# Patient Record
Sex: MALE | Race: WHITE | Employment: FULL TIME | URBAN - METROPOLITAN AREA
[De-identification: names, ages, dates, MRNs, and addresses within clinical notes are randomized per-mention and may not be internally consistent; named-entity substitution may affect disease eponyms.]

---

## 2018-02-01 ENCOUNTER — HOSPITAL ENCOUNTER (EMERGENCY)
Age: 43
Discharge: HOME | End: 2018-02-01
Attending: EMERGENCY MEDICINE
Payer: COMMERCIAL

## 2018-02-01 VITALS
HEIGHT: 69 IN | TEMPERATURE: 101.7 F | OXYGEN SATURATION: 96 % | HEART RATE: 116 BPM | RESPIRATION RATE: 18 BRPM | WEIGHT: 210 LBS | BODY MASS INDEX: 31.1 KG/M2 | SYSTOLIC BLOOD PRESSURE: 161 MMHG | DIASTOLIC BLOOD PRESSURE: 90 MMHG

## 2018-02-01 DIAGNOSIS — J02.9 VIRAL PHARYNGITIS: ICD-10-CM

## 2018-02-01 DIAGNOSIS — J11.1 FLU: Primary | ICD-10-CM

## 2018-02-01 LAB
RAPID INFLUENZA  B AGN: POSITIVE
RAPID INFLUENZA A AGN: NEGATIVE
S PYO AG THROAT QL: NEGATIVE

## 2018-02-01 PROCEDURE — 87081 CULTURE SCREEN ONLY: CPT

## 2018-02-01 PROCEDURE — 99283 EMERGENCY DEPT VISIT LOW MDM: CPT

## 2018-02-01 PROCEDURE — 6370000000 HC RX 637 (ALT 250 FOR IP): Performed by: EMERGENCY MEDICINE

## 2018-02-01 PROCEDURE — 87880 STREP A ASSAY W/OPTIC: CPT

## 2018-02-01 PROCEDURE — 86403 PARTICLE AGGLUT ANTBDY SCRN: CPT

## 2018-02-01 RX ORDER — OSELTAMIVIR PHOSPHATE 75 MG/1
75 CAPSULE ORAL 2 TIMES DAILY
Qty: 10 CAPSULE | Refills: 0 | Status: SHIPPED | OUTPATIENT
Start: 2018-02-01 | End: 2018-02-06

## 2018-02-01 RX ORDER — IBUPROFEN 800 MG/1
800 TABLET ORAL EVERY 8 HOURS PRN
Qty: 30 TABLET | Refills: 0 | Status: SHIPPED | OUTPATIENT
Start: 2018-02-01

## 2018-02-01 RX ORDER — ACETAMINOPHEN 500 MG
1000 TABLET ORAL ONCE
Status: COMPLETED | OUTPATIENT
Start: 2018-02-01 | End: 2018-02-01

## 2018-02-01 RX ORDER — ESCITALOPRAM OXALATE 10 MG/1
10 TABLET ORAL DAILY
Status: ON HOLD | COMMUNITY
End: 2021-05-05

## 2018-02-01 RX ORDER — FENOFIBRATE 145 MG/1
145 TABLET, COATED ORAL DAILY
COMMUNITY

## 2018-02-01 RX ADMIN — ACETAMINOPHEN 1000 MG: 500 TABLET ORAL at 01:46

## 2018-02-01 ASSESSMENT — ENCOUNTER SYMPTOMS
SHORTNESS OF BREATH: 0
VOMITING: 0
SINUS PAIN: 1
FACIAL SWELLING: 0
CONSTIPATION: 0
ABDOMINAL PAIN: 0
BLOOD IN STOOL: 0
CHOKING: 0
RHINORRHEA: 1
EYE REDNESS: 0
VOICE CHANGE: 0
BACK PAIN: 0
WHEEZING: 0
SINUS PRESSURE: 0
CHEST TIGHTNESS: 0
EYE PAIN: 0
COUGH: 0
EYE DISCHARGE: 0
STRIDOR: 0
TROUBLE SWALLOWING: 0
SORE THROAT: 0
DIARRHEA: 0

## 2018-02-01 ASSESSMENT — PAIN DESCRIPTION - LOCATION: LOCATION: THROAT

## 2018-02-01 ASSESSMENT — PAIN SCALES - GENERAL: PAINLEVEL_OUTOF10: 8

## 2018-02-01 ASSESSMENT — PAIN DESCRIPTION - DESCRIPTORS: DESCRIPTORS: ACHING

## 2018-02-03 LAB — S PYO THROAT QL CULT: NORMAL

## 2021-05-05 ENCOUNTER — HOSPITAL ENCOUNTER (OUTPATIENT)
Age: 46
Setting detail: OUTPATIENT SURGERY
Discharge: HOME | End: 2021-05-05
Attending: ORTHOPAEDIC SURGERY | Admitting: ORTHOPAEDIC SURGERY
Payer: COMMERCIAL

## 2021-05-05 ENCOUNTER — APPOINTMENT (OUTPATIENT)
Dept: GENERAL RADIOLOGY | Age: 46
End: 2021-05-05
Attending: ORTHOPAEDIC SURGERY
Payer: COMMERCIAL

## 2021-05-05 ENCOUNTER — ANESTHESIA (OUTPATIENT)
Dept: OPERATING ROOM | Age: 46
End: 2021-05-05
Payer: COMMERCIAL

## 2021-05-05 ENCOUNTER — ANESTHESIA EVENT (OUTPATIENT)
Dept: OPERATING ROOM | Age: 46
End: 2021-05-05
Payer: COMMERCIAL

## 2021-05-05 VITALS — DIASTOLIC BLOOD PRESSURE: 58 MMHG | TEMPERATURE: 94.6 F | OXYGEN SATURATION: 98 % | SYSTOLIC BLOOD PRESSURE: 106 MMHG

## 2021-05-05 VITALS
TEMPERATURE: 97 F | HEART RATE: 58 BPM | SYSTOLIC BLOOD PRESSURE: 91 MMHG | DIASTOLIC BLOOD PRESSURE: 54 MMHG | OXYGEN SATURATION: 98 % | RESPIRATION RATE: 16 BRPM

## 2021-05-05 LAB — SARS-COV-2, NAAT: NOT DETECTED

## 2021-05-05 PROCEDURE — 87635 SARS-COV-2 COVID-19 AMP PRB: CPT

## 2021-05-05 PROCEDURE — 2500000003 HC RX 250 WO HCPCS: Performed by: NURSE ANESTHETIST, CERTIFIED REGISTERED

## 2021-05-05 PROCEDURE — 7100000011 HC PHASE II RECOVERY - ADDTL 15 MIN: Performed by: ORTHOPAEDIC SURGERY

## 2021-05-05 PROCEDURE — 7100000001 HC PACU RECOVERY - ADDTL 15 MIN: Performed by: ORTHOPAEDIC SURGERY

## 2021-05-05 PROCEDURE — 3600000002 HC SURGERY LEVEL 2 BASE: Performed by: ORTHOPAEDIC SURGERY

## 2021-05-05 PROCEDURE — 7100000010 HC PHASE II RECOVERY - FIRST 15 MIN: Performed by: ORTHOPAEDIC SURGERY

## 2021-05-05 PROCEDURE — 2580000003 HC RX 258: Performed by: ORTHOPAEDIC SURGERY

## 2021-05-05 PROCEDURE — 6360000002 HC RX W HCPCS: Performed by: ORTHOPAEDIC SURGERY

## 2021-05-05 PROCEDURE — 3700000000 HC ANESTHESIA ATTENDED CARE: Performed by: ORTHOPAEDIC SURGERY

## 2021-05-05 PROCEDURE — 2709999900 HC NON-CHARGEABLE SUPPLY: Performed by: ORTHOPAEDIC SURGERY

## 2021-05-05 PROCEDURE — 6360000002 HC RX W HCPCS: Performed by: NURSE ANESTHETIST, CERTIFIED REGISTERED

## 2021-05-05 PROCEDURE — 3600000012 HC SURGERY LEVEL 2 ADDTL 15MIN: Performed by: ORTHOPAEDIC SURGERY

## 2021-05-05 PROCEDURE — 2500000003 HC RX 250 WO HCPCS: Performed by: ORTHOPAEDIC SURGERY

## 2021-05-05 PROCEDURE — 3700000001 HC ADD 15 MINUTES (ANESTHESIA): Performed by: ORTHOPAEDIC SURGERY

## 2021-05-05 PROCEDURE — 7100000000 HC PACU RECOVERY - FIRST 15 MIN: Performed by: ORTHOPAEDIC SURGERY

## 2021-05-05 RX ORDER — DEXAMETHASONE SODIUM PHOSPHATE 4 MG/ML
INJECTION, SOLUTION INTRA-ARTICULAR; INTRALESIONAL; INTRAMUSCULAR; INTRAVENOUS; SOFT TISSUE PRN
Status: DISCONTINUED | OUTPATIENT
Start: 2021-05-05 | End: 2021-05-05 | Stop reason: SDUPTHER

## 2021-05-05 RX ORDER — FENTANYL CITRATE 50 UG/ML
50 INJECTION, SOLUTION INTRAMUSCULAR; INTRAVENOUS EVERY 10 MIN PRN
Status: DISCONTINUED | OUTPATIENT
Start: 2021-05-05 | End: 2021-05-05 | Stop reason: HOSPADM

## 2021-05-05 RX ORDER — MAGNESIUM HYDROXIDE 1200 MG/15ML
LIQUID ORAL CONTINUOUS PRN
Status: COMPLETED | OUTPATIENT
Start: 2021-05-05 | End: 2021-05-05

## 2021-05-05 RX ORDER — SODIUM CHLORIDE, SODIUM LACTATE, POTASSIUM CHLORIDE, CALCIUM CHLORIDE 600; 310; 30; 20 MG/100ML; MG/100ML; MG/100ML; MG/100ML
INJECTION, SOLUTION INTRAVENOUS CONTINUOUS
Status: DISCONTINUED | OUTPATIENT
Start: 2021-05-05 | End: 2021-05-05 | Stop reason: HOSPADM

## 2021-05-05 RX ORDER — ONDANSETRON 2 MG/ML
4 INJECTION INTRAMUSCULAR; INTRAVENOUS
Status: DISCONTINUED | OUTPATIENT
Start: 2021-05-05 | End: 2021-05-05 | Stop reason: HOSPADM

## 2021-05-05 RX ORDER — HYDROCODONE BITARTRATE AND ACETAMINOPHEN 5; 325 MG/1; MG/1
1 TABLET ORAL PRN
Status: DISCONTINUED | OUTPATIENT
Start: 2021-05-05 | End: 2021-05-05 | Stop reason: HOSPADM

## 2021-05-05 RX ORDER — FENTANYL CITRATE 50 UG/ML
INJECTION, SOLUTION INTRAMUSCULAR; INTRAVENOUS PRN
Status: DISCONTINUED | OUTPATIENT
Start: 2021-05-05 | End: 2021-05-05 | Stop reason: SDUPTHER

## 2021-05-05 RX ORDER — SODIUM CHLORIDE 0.9 % (FLUSH) 0.9 %
10 SYRINGE (ML) INJECTION PRN
Status: DISCONTINUED | OUTPATIENT
Start: 2021-05-05 | End: 2021-05-05 | Stop reason: HOSPADM

## 2021-05-05 RX ORDER — SODIUM CHLORIDE 9 MG/ML
25 INJECTION, SOLUTION INTRAVENOUS PRN
Status: DISCONTINUED | OUTPATIENT
Start: 2021-05-05 | End: 2021-05-05 | Stop reason: HOSPADM

## 2021-05-05 RX ORDER — DIPHENHYDRAMINE HYDROCHLORIDE 50 MG/ML
12.5 INJECTION INTRAMUSCULAR; INTRAVENOUS
Status: DISCONTINUED | OUTPATIENT
Start: 2021-05-05 | End: 2021-05-05 | Stop reason: HOSPADM

## 2021-05-05 RX ORDER — LIDOCAINE HYDROCHLORIDE 20 MG/ML
INJECTION, SOLUTION INTRAVENOUS PRN
Status: DISCONTINUED | OUTPATIENT
Start: 2021-05-05 | End: 2021-05-05 | Stop reason: SDUPTHER

## 2021-05-05 RX ORDER — ONDANSETRON 2 MG/ML
INJECTION INTRAMUSCULAR; INTRAVENOUS PRN
Status: DISCONTINUED | OUTPATIENT
Start: 2021-05-05 | End: 2021-05-05 | Stop reason: SDUPTHER

## 2021-05-05 RX ORDER — MIDAZOLAM HYDROCHLORIDE 2 MG/2ML
INJECTION, SOLUTION INTRAMUSCULAR; INTRAVENOUS PRN
Status: DISCONTINUED | OUTPATIENT
Start: 2021-05-05 | End: 2021-05-05 | Stop reason: SDUPTHER

## 2021-05-05 RX ORDER — CEFAZOLIN SODIUM 2 G/50ML
2000 SOLUTION INTRAVENOUS
Status: COMPLETED | OUTPATIENT
Start: 2021-05-05 | End: 2021-05-05

## 2021-05-05 RX ORDER — PROPOFOL 10 MG/ML
INJECTION, EMULSION INTRAVENOUS PRN
Status: DISCONTINUED | OUTPATIENT
Start: 2021-05-05 | End: 2021-05-05 | Stop reason: SDUPTHER

## 2021-05-05 RX ORDER — HYDROCODONE BITARTRATE AND ACETAMINOPHEN 5; 325 MG/1; MG/1
2 TABLET ORAL PRN
Status: DISCONTINUED | OUTPATIENT
Start: 2021-05-05 | End: 2021-05-05 | Stop reason: HOSPADM

## 2021-05-05 RX ORDER — LIDOCAINE HYDROCHLORIDE 10 MG/ML
1 INJECTION, SOLUTION EPIDURAL; INFILTRATION; INTRACAUDAL; PERINEURAL
Status: DISCONTINUED | OUTPATIENT
Start: 2021-05-05 | End: 2021-05-05 | Stop reason: HOSPADM

## 2021-05-05 RX ORDER — SODIUM CHLORIDE 0.9 % (FLUSH) 0.9 %
10 SYRINGE (ML) INJECTION EVERY 12 HOURS SCHEDULED
Status: DISCONTINUED | OUTPATIENT
Start: 2021-05-05 | End: 2021-05-05 | Stop reason: HOSPADM

## 2021-05-05 RX ORDER — MEPERIDINE HYDROCHLORIDE 25 MG/ML
12.5 INJECTION INTRAMUSCULAR; INTRAVENOUS; SUBCUTANEOUS EVERY 5 MIN PRN
Status: DISCONTINUED | OUTPATIENT
Start: 2021-05-05 | End: 2021-05-05 | Stop reason: HOSPADM

## 2021-05-05 RX ORDER — BUPIVACAINE HYDROCHLORIDE 5 MG/ML
INJECTION, SOLUTION EPIDURAL; INTRACAUDAL PRN
Status: DISCONTINUED | OUTPATIENT
Start: 2021-05-05 | End: 2021-05-05 | Stop reason: ALTCHOICE

## 2021-05-05 RX ORDER — METOCLOPRAMIDE HYDROCHLORIDE 5 MG/ML
10 INJECTION INTRAMUSCULAR; INTRAVENOUS
Status: DISCONTINUED | OUTPATIENT
Start: 2021-05-05 | End: 2021-05-05 | Stop reason: HOSPADM

## 2021-05-05 RX ORDER — ROCURONIUM BROMIDE 10 MG/ML
INJECTION, SOLUTION INTRAVENOUS PRN
Status: DISCONTINUED | OUTPATIENT
Start: 2021-05-05 | End: 2021-05-05 | Stop reason: SDUPTHER

## 2021-05-05 RX ADMIN — FENTANYL CITRATE 25 MCG: 50 INJECTION, SOLUTION INTRAMUSCULAR; INTRAVENOUS at 09:31

## 2021-05-05 RX ADMIN — CEFAZOLIN SODIUM 2000 MG: 2 SOLUTION INTRAVENOUS at 09:24

## 2021-05-05 RX ADMIN — LIDOCAINE HYDROCHLORIDE 50 MG: 20 INJECTION, SOLUTION INTRAVENOUS at 09:29

## 2021-05-05 RX ADMIN — ONDANSETRON 4 MG: 2 INJECTION INTRAMUSCULAR; INTRAVENOUS at 10:05

## 2021-05-05 RX ADMIN — ROCURONIUM BROMIDE 10 MG: 10 INJECTION INTRAVENOUS at 10:17

## 2021-05-05 RX ADMIN — FENTANYL CITRATE 25 MCG: 50 INJECTION, SOLUTION INTRAMUSCULAR; INTRAVENOUS at 09:30

## 2021-05-05 RX ADMIN — DEXAMETHASONE SODIUM PHOSPHATE 4 MG: 4 INJECTION, SOLUTION INTRAMUSCULAR; INTRAVENOUS at 09:37

## 2021-05-05 RX ADMIN — FENTANYL CITRATE 50 MCG: 50 INJECTION, SOLUTION INTRAMUSCULAR; INTRAVENOUS at 10:58

## 2021-05-05 RX ADMIN — FENTANYL CITRATE 25 MCG: 50 INJECTION, SOLUTION INTRAMUSCULAR; INTRAVENOUS at 09:32

## 2021-05-05 RX ADMIN — ROCURONIUM BROMIDE 20 MG: 10 INJECTION INTRAVENOUS at 10:22

## 2021-05-05 RX ADMIN — SODIUM CHLORIDE, POTASSIUM CHLORIDE, SODIUM LACTATE AND CALCIUM CHLORIDE: 600; 310; 30; 20 INJECTION, SOLUTION INTRAVENOUS at 08:31

## 2021-05-05 RX ADMIN — FENTANYL CITRATE 25 MCG: 50 INJECTION, SOLUTION INTRAMUSCULAR; INTRAVENOUS at 10:00

## 2021-05-05 RX ADMIN — FENTANYL CITRATE 25 MCG: 50 INJECTION, SOLUTION INTRAMUSCULAR; INTRAVENOUS at 09:29

## 2021-05-05 RX ADMIN — SODIUM CHLORIDE, POTASSIUM CHLORIDE, SODIUM LACTATE AND CALCIUM CHLORIDE: 600; 310; 30; 20 INJECTION, SOLUTION INTRAVENOUS at 10:45

## 2021-05-05 RX ADMIN — PROPOFOL 200 MG: 10 INJECTION, EMULSION INTRAVENOUS at 09:29

## 2021-05-05 RX ADMIN — FENTANYL CITRATE 25 MCG: 50 INJECTION, SOLUTION INTRAMUSCULAR; INTRAVENOUS at 09:58

## 2021-05-05 RX ADMIN — MIDAZOLAM HYDROCHLORIDE 2 MG: 1 INJECTION, SOLUTION INTRAMUSCULAR; INTRAVENOUS at 09:22

## 2021-05-05 RX ADMIN — SUGAMMADEX 200 MG: 100 INJECTION, SOLUTION INTRAVENOUS at 10:54

## 2021-05-05 ASSESSMENT — PULMONARY FUNCTION TESTS
PIF_VALUE: 10
PIF_VALUE: 10
PIF_VALUE: 11
PIF_VALUE: 2
PIF_VALUE: 11
PIF_VALUE: 3
PIF_VALUE: 10
PIF_VALUE: 14
PIF_VALUE: 10
PIF_VALUE: 14
PIF_VALUE: 11
PIF_VALUE: 10
PIF_VALUE: 13
PIF_VALUE: 14
PIF_VALUE: 14
PIF_VALUE: 10
PIF_VALUE: 1
PIF_VALUE: 14
PIF_VALUE: 10
PIF_VALUE: 14
PIF_VALUE: 13
PIF_VALUE: 11
PIF_VALUE: 10
PIF_VALUE: 10
PIF_VALUE: 14
PIF_VALUE: 3
PIF_VALUE: 14
PIF_VALUE: 11
PIF_VALUE: 14
PIF_VALUE: 11
PIF_VALUE: 0
PIF_VALUE: 4
PIF_VALUE: 10
PIF_VALUE: 14
PIF_VALUE: 10
PIF_VALUE: 11
PIF_VALUE: 14
PIF_VALUE: 10
PIF_VALUE: 14
PIF_VALUE: 10
PIF_VALUE: 14
PIF_VALUE: 11
PIF_VALUE: 10
PIF_VALUE: 10
PIF_VALUE: 0
PIF_VALUE: 10
PIF_VALUE: 9
PIF_VALUE: 14
PIF_VALUE: 10
PIF_VALUE: 10
PIF_VALUE: 11
PIF_VALUE: 4
PIF_VALUE: 11
PIF_VALUE: 10
PIF_VALUE: 10
PIF_VALUE: 11

## 2021-05-05 NOTE — ANESTHESIA POSTPROCEDURE EVALUATION
Department of Anesthesiology  Postprocedure Note    Patient: Kaylynn Ramirez  MRN: [id number]  YOB: 1975  Date of evaluation: 5/5/2021  Time:  11:13 AM     Procedure Summary     Date: 05/05/21 Room / Location: [address]    Anesthesia Start: 2298 Anesthesia Stop:     Procedure: RIGHT SMALL FINGER FLEXOR TENDON REPAIR (Right Fingers) Diagnosis: (RIGHT SMALL FINGER FLEXOR TENDON  LACERATION)    Surgeons: Nestor Esteves DO Responsible Provider: Dorothey Lefort, DO    Anesthesia Type: general ASA Status: 1          Anesthesia Type: No value filed. Hernan Phase I:      Hernan Phase II:      Last vitals: Reviewed and per EMR flowsheets.        Anesthesia Post Evaluation    Patient location during evaluation: PACU  Patient participation: waiting for patient participation  Level of consciousness: responsive to physical stimuli  Pain score: 0  Airway patency: patent  Nausea & Vomiting: no vomiting and no nausea  Complications: no  Cardiovascular status: hemodynamically stable  Respiratory status: acceptable, face mask and oral airway  Hydration status: stable

## 2021-05-05 NOTE — ANESTHESIA PRE PROCEDURE
last solid consumption: 1900                        Date of last liquid consumption: 05/04/21                        Date of last solid food consumption: 05/04/21    BMI:   Wt Readings from Last 3 Encounters:   02/01/18 210 lb (95.3 kg)     There is no height or weight on file to calculate BMI.    CBC: No results found for: WBC, RBC, HGB, HCT, MCV, RDW, PLT    CMP: No results found for: NA, K, CL, CO2, BUN, CREATININE, GFRAA, AGRATIO, LABGLOM, GLUCOSE, PROT, CALCIUM, BILITOT, ALKPHOS, AST, ALT    POC Tests: No results for input(s): POCGLU, POCNA, POCK, POCCL, POCBUN, POCHEMO, POCHCT in the last 72 hours. Coags: No results found for: PROTIME, INR, APTT    HCG (If Applicable): No results found for: PREGTESTUR, PREGSERUM, HCG, HCGQUANT     ABGs: No results found for: PHART, PO2ART, SPQ5QSQ, LYW6DUA, BEART, [id number]     Type & Screen (If Applicable):  No results found for: LABABO, LABRH    Drug/Infectious Status (If Applicable):  No results found for: HIV, HEPCAB    COVID-19 Screening (If Applicable):   Lab Results   Component Value Date    COVID19 Not Detected 05/05/2021           Anesthesia Evaluation  Patient summary reviewed and Nursing notes reviewed no history of anesthetic complications:   Airway: Mallampati: II  TM distance: >3 FB   Neck ROM: full  Mouth opening: > = 3 FB Dental: normal exam         Pulmonary:Negative Pulmonary ROS and normal exam  breath sounds clear to auscultation                             Cardiovascular:  Exercise tolerance: good (>4 METS),   (+) hyperlipidemia        Rhythm: regular  Rate: normal           Beta Blocker:  Not on Beta Blocker         Neuro/Psych:   (+) psychiatric history:            GI/Hepatic/Renal: Neg GI/Hepatic/Renal ROS            Endo/Other: Negative Endo/Other ROS             Pt had PAT visit. Abdominal:           Vascular: negative vascular ROS. Anesthesia Plan      general     ASA 1     (LMA)  Induction: intravenous. MIPS: Postoperative opioids intended and Prophylactic antiemetics administered. Anesthetic plan and risks discussed with patient. Plan discussed with CRNA.     Attending anesthesiologist reviewed and agrees with DO Maggie   5/5/2021

## 2021-05-05 NOTE — OP NOTE
Operative Note      Patient: Cholo Gabriel  YOB: 1975  MRN: [id number]    Date of Procedure: 5/5/2021    Preoperative diagnosis: Right small finger laceration within flexor zone 2 with flexor tendon disruption    Postoperative diagnosis: Right small finger laceration within flexor zone 2 with full-thickness laceration of flexor digitorum profundus     Procedure planned: Open exploration to right small finger with excisional debridement and repair of flexor tendon or flexor tendons    Procedure performed: Right small finger excisional debridement removing nonviable skin subcutaneous tissue tendon and pulley. Repair of flexor digitorum profundus laceration within flexor zone 2    Surgeon: Pawel Donovan D.O. Assistant: AKILA Armstrong  The physician assistant was present through the entire case. Given the nature of the disease process and the procedure to be performed a skilled surgical assistant was necessary during the case. The assistant was necessary in order to hold retractors and directly assist in the operation. A certified scrub tech was at the back table managing instruments and supplies for the surgical case. Anesthesia: General    Estimated blood loss: Less than 10 cc    Drains: None    Tourniquet: Approximately 45 minutes at 250 mmHg to the well-padded right upper arm    Specimens: None    Implants: None    Indications: The patient presented to the office yesterday afternoon status post laceration of the left small finger. Laceration was approximately 1 mm distal to the PIP flexion crease. Based on physical examination was felt that both flexor digitorum profundus and flexor digitorum superficialis were lacerated. Treatment options were discussed. The patient elected to proceed forth with recommended surgery for open exploration with repair of flexor tendons as needed. Informed consent was signed and placed in the chart.   We took time to explain the need for meticulous compliance and rigorous formal therapy sessions if he wanted to have a good outcome with this complex procedure. Complications: None noted at the time of surgery     Description of operation: The patient was taken to the operative suite and placed in the supine position on the operating table. A timeout was performed and the right small finger was confirmed to be the operative site. He was carefully positioned in such a fashion as to pad all bony prominences and peripheral nerves. He was administered appropriate IV antibiotics and general anesthesia. He was prepped and draped in the normal sterile fashion. The tourniquet was elevated. The hand was positioned on the table with the help of a lead hand positioner. A planned Bev Dais incision was marked out over the volar aspect of the right small finger incorporating the zone of traumatic laceration. The 15 blade was used to incise skin extending from the MCP flexion crease up to the pulp. Dissection was carried through the subcutaneous plane with the tenotomy scissors exposing the flexor retinacular sheath. There was obvious injury to the flexor sheath within flexor zone 2. The profundus stump distally was noted to be approximately 1.5 cm and found deep to the A5 pulley. The proximal stump was noted to be atypically retracted and found at approximately the level between the proximal and distal palmar flexion crease. Care was taken to preserve the A2 and A4 pulleys as we passed the profundus stump distally through the pulley system. Flexor digitorum superficialis was intact. We had just enough distal stump to repair the flexor digitorum profundus primarily. 4-0 fiber loop suture through a 6 core stitch technique as described by Dr. Marlo Costa was used to repair the flexor digitorum profundus. This was reinforced with 6.0 nylon epitendinous stitch. Irrigation was performed. There was no gapping of the repair with passive extension of the digit.   The tourniquet was relieved and hemostasis achieved. The digit was noted to be viable. Neurovascular bundles were not noted to be injured with the traumatic injury. Hemostasis was achieved. Interrupted nylon stitches were used to close the skin. A bulky soft dressing was placed along with dorsal blocking splint with the hand in the intrinsic plus position. The patient was allowed to arise from anesthesia and taken to recovery in stable condition. Overall he tolerated the procedure well.      Disposition: Stable to PACU        Electronically signed by Micheal Mora DO on 5/5/2021 at 3:54 PM

## 2021-05-05 NOTE — PROGRESS NOTES
Patient received from PACU, no complaints of pain. Snack and beverage given, tolerating well. Discharge instructions given, patient verbalized understanding.

## 2021-05-05 NOTE — PROGRESS NOTES
Patient ID:  Troy Menchaca  [id number]  54 y.o.  1975  BOVIE PAD SITE CLEAR AND INTACT PRE AND POST OP. TAKEN TO PACU,   ATTACHED TO MONITOR AND REPORT GIVEN TO RN.   VSS DRSG DRY AND INTACT, SLING ON        Electronically signed by Marcelle Hallman RN on 5/5/2021

## 2021-10-15 ENCOUNTER — HOSPITAL ENCOUNTER (OUTPATIENT)
Dept: GENERAL RADIOLOGY | Age: 46
Discharge: HOME | End: 2021-10-15

## 2021-10-15 DIAGNOSIS — Z02.1 PHYSICAL EXAM, PRE-EMPLOYMENT: ICD-10-CM

## 2022-03-03 ENCOUNTER — OFFICE VISIT (OUTPATIENT)
Dept: INTERNAL MEDICINE | Age: 47
End: 2022-03-03
Payer: COMMERCIAL

## 2022-03-03 VITALS
SYSTOLIC BLOOD PRESSURE: 120 MMHG | WEIGHT: 220 LBS | OXYGEN SATURATION: 98 % | DIASTOLIC BLOOD PRESSURE: 84 MMHG | HEART RATE: 83 BPM | HEIGHT: 69 IN | BODY MASS INDEX: 32.58 KG/M2

## 2022-03-03 DIAGNOSIS — J06.9 ACUTE URI: Primary | ICD-10-CM

## 2022-03-03 DIAGNOSIS — R68.89 FLU-LIKE SYMPTOMS: ICD-10-CM

## 2022-03-03 LAB
INFLUENZA A ANTIBODY: NORMAL
INFLUENZA B ANTIBODY: NORMAL

## 2022-03-03 PROCEDURE — G8482 FLU IMMUNIZE ORDER/ADMIN: HCPCS | Performed by: NURSE PRACTITIONER

## 2022-03-03 PROCEDURE — 1036F TOBACCO NON-USER: CPT | Performed by: NURSE PRACTITIONER

## 2022-03-03 PROCEDURE — G8417 CALC BMI ABV UP PARAM F/U: HCPCS | Performed by: NURSE PRACTITIONER

## 2022-03-03 PROCEDURE — 99202 OFFICE O/P NEW SF 15 MIN: CPT | Performed by: NURSE PRACTITIONER

## 2022-03-03 PROCEDURE — G8427 DOCREV CUR MEDS BY ELIG CLIN: HCPCS | Performed by: NURSE PRACTITIONER

## 2022-03-03 PROCEDURE — 87804 INFLUENZA ASSAY W/OPTIC: CPT | Performed by: NURSE PRACTITIONER

## 2022-03-03 SDOH — ECONOMIC STABILITY: FOOD INSECURITY: WITHIN THE PAST 12 MONTHS, YOU WORRIED THAT YOUR FOOD WOULD RUN OUT BEFORE YOU GOT MONEY TO BUY MORE.: NEVER TRUE

## 2022-03-03 SDOH — ECONOMIC STABILITY: FOOD INSECURITY: WITHIN THE PAST 12 MONTHS, THE FOOD YOU BOUGHT JUST DIDN'T LAST AND YOU DIDN'T HAVE MONEY TO GET MORE.: NEVER TRUE

## 2022-03-03 ASSESSMENT — ENCOUNTER SYMPTOMS
ABDOMINAL PAIN: 0
COUGH: 1
VOMITING: 0
RHINORRHEA: 0
SHORTNESS OF BREATH: 0
SINUS PRESSURE: 1
NAUSEA: 0
DIARRHEA: 0
SINUS PAIN: 0
SORE THROAT: 1
WHEEZING: 0

## 2022-03-03 ASSESSMENT — PATIENT HEALTH QUESTIONNAIRE - PHQ9
7. TROUBLE CONCENTRATING ON THINGS, SUCH AS READING THE NEWSPAPER OR WATCHING TELEVISION: 0
SUM OF ALL RESPONSES TO PHQ QUESTIONS 1-9: 3
SUM OF ALL RESPONSES TO PHQ QUESTIONS 1-9: 3
4. FEELING TIRED OR HAVING LITTLE ENERGY: 1
1. LITTLE INTEREST OR PLEASURE IN DOING THINGS: 0
SUM OF ALL RESPONSES TO PHQ9 QUESTIONS 1 & 2: 0
2. FEELING DOWN, DEPRESSED OR HOPELESS: 0
SUM OF ALL RESPONSES TO PHQ QUESTIONS 1-9: 3
9. THOUGHTS THAT YOU WOULD BE BETTER OFF DEAD, OR OF HURTING YOURSELF: 0
8. MOVING OR SPEAKING SO SLOWLY THAT OTHER PEOPLE COULD HAVE NOTICED. OR THE OPPOSITE, BEING SO FIGETY OR RESTLESS THAT YOU HAVE BEEN MOVING AROUND A LOT MORE THAN USUAL: 0
5. POOR APPETITE OR OVEREATING: 0
SUM OF ALL RESPONSES TO PHQ QUESTIONS 1-9: 3
10. IF YOU CHECKED OFF ANY PROBLEMS, HOW DIFFICULT HAVE THESE PROBLEMS MADE IT FOR YOU TO DO YOUR WORK, TAKE CARE OF THINGS AT HOME, OR GET ALONG WITH OTHER PEOPLE: 0
6. FEELING BAD ABOUT YOURSELF - OR THAT YOU ARE A FAILURE OR HAVE LET YOURSELF OR YOUR FAMILY DOWN: 0
3. TROUBLE FALLING OR STAYING ASLEEP: 2

## 2022-03-03 ASSESSMENT — SOCIAL DETERMINANTS OF HEALTH (SDOH): HOW HARD IS IT FOR YOU TO PAY FOR THE VERY BASICS LIKE FOOD, HOUSING, MEDICAL CARE, AND HEATING?: NOT HARD AT ALL

## 2022-03-03 NOTE — PROGRESS NOTES
Subjective:      Patient ID: Twyla Mane is a 51 y.o. male who presents today for:  Chief Complaint   Patient presents with    URI     body aches, fatigue, x 3 days, home covid test was negative this morning       URI   This is a new problem. Episode onset: 3 days ago. The problem has been gradually worsening. There has been no fever. Associated symptoms include congestion, coughing, headaches and a sore throat. Pertinent negatives include no abdominal pain, chest pain, diarrhea, ear pain, nausea, rash, rhinorrhea, sinus pain, vomiting or wheezing. Associated symptoms comments: Denies loss of sense of taste or smell, no known exposure to covid, negative test at home. He has tried nothing for the symptoms. History reviewed. No pertinent past medical history. Past Surgical History:   Procedure Laterality Date    CORNEAL TRANSPLANT Left     HAND TENDON SURGERY Right 5/5/2021    RIGHT SMALL FINGER FLEXOR TENDON REPAIR performed by Norbert Apodaca DO at [facility]     History reviewed. No pertinent family history. No Known Allergies      Review of Systems   Constitutional: Positive for fatigue. Negative for chills and fever. HENT: Positive for congestion, postnasal drip, sinus pressure and sore throat. Negative for ear pain, rhinorrhea and sinus pain. Respiratory: Positive for cough. Negative for shortness of breath and wheezing. Cardiovascular: Negative for chest pain. Gastrointestinal: Negative for abdominal pain, diarrhea, nausea and vomiting. Musculoskeletal: Negative for arthralgias and myalgias. Skin: Negative for rash. Neurological: Positive for headaches. Objective:   /84 (Site: Left Upper Arm, Position: Sitting, Cuff Size: Large Adult)   Pulse 83   Ht 5' 9\" (1.753 m)   Wt 220 lb (99.8 kg)   SpO2 98%   BMI 32.49 kg/m²     Physical Exam  Vitals reviewed. Constitutional:       General: He is not in acute distress. Appearance: He is well-developed.  He is not ill-appearing. HENT:      Head: Normocephalic. Right Ear: Tympanic membrane, ear canal and external ear normal.      Left Ear: Tympanic membrane, ear canal and external ear normal.      Nose: Nose normal. No mucosal edema, congestion or rhinorrhea. Right Sinus: No maxillary sinus tenderness or frontal sinus tenderness. Left Sinus: No maxillary sinus tenderness or frontal sinus tenderness. Mouth/Throat:      Lips: Pink. Mouth: Mucous membranes are moist.      Pharynx: Oropharynx is clear. No oropharyngeal exudate or posterior oropharyngeal erythema. Comments: PND  Cardiovascular:      Rate and Rhythm: Normal rate and regular rhythm. Heart sounds: Normal heart sounds. Pulmonary:      Effort: Pulmonary effort is normal. No respiratory distress. Breath sounds: Normal breath sounds. No decreased breath sounds or wheezing. Musculoskeletal:         General: Normal range of motion. Lymphadenopathy:      Cervical: No cervical adenopathy. Skin:     General: Skin is warm and dry. Neurological:      Mental Status: He is alert and oriented to person, place, and time. Assessment:       Diagnosis Orders   1. Acute URI     2. Flu-like symptoms  POCT Influenza A/B         Plan:      Orders Placed This Encounter   Procedures    POCT Influenza A/B     Flu negative. Reviewed usual course of a viral illness, preventing the spread to others, and supportive measures for symptom management. OTC symptom control. Reviewed symptoms requiring ER. Patient verbalizes understanding. I have reviewed and updated the electronic medical record. Return if symptoms worsen or fail to improve, for follow up with PCP.     XENA York - NP

## 2022-03-03 NOTE — PATIENT INSTRUCTIONS

## 2023-10-09 DIAGNOSIS — F52.4 PREMATURE EJACULATION: Primary | ICD-10-CM

## 2023-10-10 RX ORDER — PAROXETINE 10 MG/1
10 TABLET, FILM COATED ORAL EVERY MORNING
Qty: 30 TABLET | Refills: 11 | Status: SHIPPED | OUTPATIENT
Start: 2023-10-10 | End: 2024-01-09

## 2023-12-07 DIAGNOSIS — F52.4 PREMATURE EJACULATION: ICD-10-CM

## 2023-12-14 DIAGNOSIS — N52.9 ERECTILE DYSFUNCTION, UNSPECIFIED ERECTILE DYSFUNCTION TYPE: ICD-10-CM

## 2023-12-14 RX ORDER — PAROXETINE 10 MG/1
10 TABLET, FILM COATED ORAL EVERY MORNING
Qty: 30 TABLET | Refills: 11 | Status: SHIPPED | OUTPATIENT
Start: 2023-12-14 | End: 2024-12-13

## 2024-01-09 RX ORDER — PAROXETINE 10 MG/1
10 TABLET, FILM COATED ORAL DAILY
Qty: 30 TABLET | Refills: 11 | Status: SHIPPED | OUTPATIENT
Start: 2024-01-09

## 2024-01-15 ENCOUNTER — APPOINTMENT (OUTPATIENT)
Dept: UROLOGY | Facility: CLINIC | Age: 49
End: 2024-01-15
Payer: COMMERCIAL

## (undated) DEVICE — GLOVE ORTHO 7 1/2   MSG9475

## (undated) DEVICE — SPONGE GZ W4XL4IN COT 12 PLY TYP VII WVN C FLD DSGN

## (undated) DEVICE — BANDAGE COMPR W4INXL5YD WHT BGE POLY COT M E WRP WV HK AND

## (undated) DEVICE — NEPTUNE E-SEP SMOKE EVACUATION PENCIL, COATED, 70MM BLADE, PUSH BUTTON SWITCH: Brand: NEPTUNE E-SEP

## (undated) DEVICE — 1010 S-DRAPE TOWEL DRAPE 10/BX: Brand: STERI-DRAPE™

## (undated) DEVICE — SUTURE FIBERLOOP SZ 4-0 LAIN BLU L17.9MM 3/8 CIR TAPR NDL AR722912

## (undated) DEVICE — TUBING, SUCTION, 1/4" X 10', STRAIGHT: Brand: MEDLINE

## (undated) DEVICE — PADDING CAST N ADH 4 YDX3 IN HIGHLY ABSORBENT EZ APPL SOFROL

## (undated) DEVICE — PADDING CAST W4INXL4YD HIGHLY ABSRB THAN COT EZ APPL

## (undated) DEVICE — SINGLE PORT MANIFOLD: Brand: NEPTUNE 2

## (undated) DEVICE — SYRINGE IRRIG 60ML SFT PLIABLE BLB EZ TO GRP 1 HND USE W/

## (undated) DEVICE — DRESSING PETRO W3XL8IN OIL EMUL N ADH GZ KNIT IMPREG CELOS

## (undated) DEVICE — BANDAGE COMPR W3INXL5YD BGE POLY COT E RECOVERABLE BRTH W/

## (undated) DEVICE — APPLICATOR MEDICATED 26 CC SOLUTION HI LT ORNG CHLORAPREP

## (undated) DEVICE — COVER LT HNDL BLU PLAS

## (undated) DEVICE — HAND II: Brand: MEDLINE INDUSTRIES, INC.

## (undated) DEVICE — ELECTRODE PT RET AD L9FT HI MOIST COND ADH HYDRGEL CORDED

## (undated) DEVICE — BANDAGE GZ W2XL75IN ST RAYON POLY CNFRM STRTCH LTWT

## (undated) DEVICE — SUTURE ETHLN SZ 4-0 L18IN NONABSORBABLE BLK L19MM PS-2 3/8 1667H

## (undated) DEVICE — PADDING UNDERCAST W4INXL12FT RAYON POLY SYN NONADHESIVE

## (undated) DEVICE — ZIMMER® STERILE DISPOSABLE TOURNIQUET CUFF, DUAL PORT, SINGLE BLADDER, 18 IN. (46 CM)

## (undated) DEVICE — SUTURE ETHLN SZ 6-0 L18IN NONABSORBABLE BLK L13MM P-3 3/8 1698G

## (undated) DEVICE — GLOVE ORANGE PI 7 1/2   MSG9075

## (undated) DEVICE — GLOVE ORANGE PI 7   MSG9070

## (undated) DEVICE — CORD,CAUTERY,BIPOLAR,STERILE: Brand: MEDLINE

## (undated) DEVICE — SPLINT CAST W3XL15IN GRN STRENGTH PLSTR OF PARIS FAST SET

## (undated) DEVICE — Device

## (undated) DEVICE — GOWN,AURORA,NONREINFORCED,LARGE: Brand: MEDLINE